# Patient Record
Sex: FEMALE | Race: WHITE | URBAN - METROPOLITAN AREA
[De-identification: names, ages, dates, MRNs, and addresses within clinical notes are randomized per-mention and may not be internally consistent; named-entity substitution may affect disease eponyms.]

---

## 2018-12-31 ENCOUNTER — APPOINTMENT (RX ONLY)
Dept: URBAN - METROPOLITAN AREA CLINIC 11 | Facility: CLINIC | Age: 33
Setting detail: DERMATOLOGY
End: 2018-12-31

## 2018-12-31 DIAGNOSIS — S02.2 FRACTURE OF NASAL BONES: ICD-10-CM

## 2018-12-31 PROBLEM — S02.81XA: Status: ACTIVE | Noted: 2018-12-31

## 2018-12-31 PROBLEM — S02.2XXB: Status: ACTIVE | Noted: 2018-12-31

## 2018-12-31 PROCEDURE — 99203 OFFICE O/P NEW LOW 30 MIN: CPT

## 2018-12-31 PROCEDURE — ? PATIENT SPECIFIC COUNSELING

## 2018-12-31 PROCEDURE — ? PRESCRIPTION

## 2018-12-31 PROCEDURE — ? COUNSELING - NASAL FRACTURE

## 2018-12-31 RX ORDER — ONDANSETRON HYDROCHLORIDE 4 MG/1
TABLET, FILM COATED ORAL
Qty: 15 | Refills: 0 | COMMUNITY
Start: 2018-12-31

## 2018-12-31 RX ADMIN — ONDANSETRON HYDROCHLORIDE: 4 TABLET, FILM COATED ORAL at 20:13

## 2018-12-31 ASSESSMENT — LOCATION DETAILED DESCRIPTION DERM
LOCATION DETAILED: LEFT NASAL BONE
LOCATION DETAILED: RIGHT NASAL BONE
LOCATION DETAILED: RIGHT NASAL BONE

## 2018-12-31 ASSESSMENT — LOCATION ZONE DERM
LOCATION ZONE: FACE
LOCATION ZONE: FACE

## 2018-12-31 ASSESSMENT — LOCATION SIMPLE DESCRIPTION DERM
LOCATION SIMPLE: RIGHT NASAL BONE
LOCATION SIMPLE: RIGHT NASAL BONE
LOCATION SIMPLE: LEFT NASAL BONE

## 2018-12-31 ASSESSMENT — PAIN INTENSITY VAS: HOW INTENSE IS YOUR PAIN 0 BEING NO PAIN, 10 BEING THE MOST SEVERE PAIN POSSIBLE?: 3/10 PAIN

## 2018-12-31 NOTE — PROCEDURE: PATIENT SPECIFIC COUNSELING
Other (Free Text): Patient instructed to apply vaseline to sutured area of the right medial canthus. Sutures left in place. Patient scheduled 1/2/[address] Gipson to discuss CT scan results and treatment plan. Patient encouraged to take Zofran and eat before taking pain medication in order to alleviate GI upset. Patient understood.
Detail Level: Detailed

## 2018-12-31 NOTE — HPI: FRACTURE, NASAL
Which Side?: the bilateral nasal bones
Which Side?: the right orbit
How Severe Is It?: mild
Is This A New Presentation, Or A Follow-Up?: Nasal Fracture

## 2018-12-31 NOTE — HPI: FRACTURE, FACE
Which Side?: the right maxilla
Which Side?: the bilateral nasal bones
Which Side?: the right orbit
How Severe Is It?: moderate
Is This A New Presentation, Or A Follow-Up?: Facial Fracture
When Did The Injury Occur?: 12/28/2018

## 2019-01-02 ENCOUNTER — APPOINTMENT (RX ONLY)
Dept: URBAN - METROPOLITAN AREA CLINIC 11 | Facility: CLINIC | Age: 34
Setting detail: DERMATOLOGY
End: 2019-01-02

## 2019-01-02 DIAGNOSIS — S02.2 FRACTURE OF NASAL BONES: ICD-10-CM

## 2019-01-02 DIAGNOSIS — S02.3 FRACTURE OF ORBITAL FLOOR: ICD-10-CM

## 2019-01-02 DIAGNOSIS — Z72.0 TOBACCO USE: ICD-10-CM

## 2019-01-02 PROBLEM — S02.2XXA FRACTURE OF NASAL BONES, INITIAL ENCOUNTER FOR CLOSED FRACTURE: Status: ACTIVE | Noted: 2019-01-02

## 2019-01-02 PROBLEM — S02.3XXA FRACTURE OF ORBITAL FLOOR, INITIAL ENCOUNTER FOR CLOSED FRACTURE: Status: ACTIVE | Noted: 2019-01-02

## 2019-01-02 PROCEDURE — ? COUNSELING - ORBITAL FRACTURE

## 2019-01-02 PROCEDURE — ? SMOKING CESSATION COUNSELING

## 2019-01-02 PROCEDURE — ? SUTURE REMOVAL

## 2019-01-02 PROCEDURE — 99406 BEHAV CHNG SMOKING 3-10 MIN: CPT

## 2019-01-02 PROCEDURE — ? ORDER FOR SURGERY

## 2019-01-02 PROCEDURE — 99213 OFFICE O/P EST LOW 20 MIN: CPT

## 2019-01-02 PROCEDURE — ? COUNSELING - NASAL FRACTURE

## 2019-01-02 ASSESSMENT — LOCATION DETAILED DESCRIPTION DERM
LOCATION DETAILED: RIGHT ORBITAL FLOOR
LOCATION DETAILED: RIGHT NASAL BONE
LOCATION DETAILED: RIGHT MEDIAL CANTHUS

## 2019-01-02 ASSESSMENT — LOCATION SIMPLE DESCRIPTION DERM
LOCATION SIMPLE: RIGHT NASAL BONE
LOCATION SIMPLE: RIGHT EYELID
LOCATION SIMPLE: RIGHT ORBITAL FLOOR

## 2019-01-02 ASSESSMENT — LOCATION ZONE DERM
LOCATION ZONE: EYELID
LOCATION ZONE: FACE

## 2019-01-02 ASSESSMENT — PAIN INTENSITY VAS: HOW INTENSE IS YOUR PAIN 0 BEING NO PAIN, 10 BEING THE MOST SEVERE PAIN POSSIBLE?: 3/10 PAIN

## 2019-01-02 NOTE — PROCEDURE: ORDER FOR SURGERY
Surgery To Be Ordered: closed reduction nasal fracture
Surgeon: Dr. Gipson
Photos Taken?: no
Date Of Surgery: TBD
Cosmetic, Major Or Minor?: Major (Insurance, 90-day global)
Admission Status: outpatient
Detail Level: Simple
Estimated Length Of Surgery: 30 - 60 min

## 2019-01-21 ENCOUNTER — APPOINTMENT (RX ONLY)
Dept: URBAN - METROPOLITAN AREA SURGERY CENTER 5 | Facility: SURGERY CENTER | Age: 34
Setting detail: DERMATOLOGY
End: 2019-01-21

## 2019-01-21 DIAGNOSIS — S02.2 FRACTURE OF NASAL BONES: ICD-10-CM

## 2019-01-21 PROBLEM — S02.2XXS FRACTURE OF NASAL BONES, SEQUELA: Status: ACTIVE | Noted: 2019-01-21

## 2019-01-21 PROCEDURE — 21320 CLSD TX NSL FX W/MNPJ&STABLJ: CPT

## 2019-01-21 PROCEDURE — ? SET GLOBAL PERIOD

## 2019-01-21 ASSESSMENT — LOCATION DETAILED DESCRIPTION DERM
LOCATION DETAILED: RIGHT NASAL BONE
LOCATION DETAILED: RIGHT NASAL BONE

## 2019-01-21 ASSESSMENT — LOCATION ZONE DERM
LOCATION ZONE: FACE
LOCATION ZONE: FACE

## 2019-01-21 ASSESSMENT — LOCATION SIMPLE DESCRIPTION DERM
LOCATION SIMPLE: RIGHT NASAL BONE
LOCATION SIMPLE: RIGHT NASAL BONE

## 2019-01-29 ENCOUNTER — APPOINTMENT (RX ONLY)
Dept: URBAN - METROPOLITAN AREA CLINIC 11 | Facility: CLINIC | Age: 34
Setting detail: DERMATOLOGY
End: 2019-01-29

## 2019-01-29 DIAGNOSIS — Z48.89 ENCOUNTER FOR OTHER SPECIFIED SURGICAL AFTERCARE: ICD-10-CM

## 2019-01-29 PROCEDURE — 99024 POSTOP FOLLOW-UP VISIT: CPT

## 2019-01-29 PROCEDURE — ? COUNSELING - POST-OP CHECK

## 2019-01-29 ASSESSMENT — LOCATION ZONE DERM: LOCATION ZONE: NOSE

## 2019-01-29 ASSESSMENT — LOCATION SIMPLE DESCRIPTION DERM: LOCATION SIMPLE: NOSE

## 2019-01-29 ASSESSMENT — LOCATION DETAILED DESCRIPTION DERM: LOCATION DETAILED: NASAL DORSUM

## 2019-01-29 NOTE — HPI: POST-OP CHECK (EXTENDED)
What Best Describes The Level Of Symmetry? (Check All That Apply): good
How Severe Is Your Pain?: 0 out of 10
How Is Your Wound Healing?: healing well
Compared To The Last Evaluation, How Have The Findings Changed?: improved
Date Of Procedure: 1/21/2019